# Patient Record
Sex: FEMALE | Race: WHITE
[De-identification: names, ages, dates, MRNs, and addresses within clinical notes are randomized per-mention and may not be internally consistent; named-entity substitution may affect disease eponyms.]

---

## 2017-03-24 ENCOUNTER — HOSPITAL ENCOUNTER (OUTPATIENT)
Dept: HOSPITAL 62 - RAD | Age: 52
End: 2017-03-24
Attending: PHYSICIAN ASSISTANT
Payer: MEDICARE

## 2017-03-24 ENCOUNTER — HOSPITAL ENCOUNTER (OUTPATIENT)
Dept: HOSPITAL 62 - OD | Age: 52
End: 2017-03-24
Attending: SPECIALIST
Payer: MEDICARE

## 2017-03-24 DIAGNOSIS — M25.561: ICD-10-CM

## 2017-03-24 DIAGNOSIS — M25.572: Primary | ICD-10-CM

## 2017-03-24 DIAGNOSIS — M17.12: ICD-10-CM

## 2017-03-24 DIAGNOSIS — E03.9: Primary | ICD-10-CM

## 2017-03-24 DIAGNOSIS — M25.571: ICD-10-CM

## 2017-03-24 DIAGNOSIS — M25.562: ICD-10-CM

## 2017-03-24 LAB
BASOPHILS # BLD AUTO: 0.1 10^3/UL (ref 0–0.2)
BASOPHILS NFR BLD AUTO: 0.8 % (ref 0–2)
EOSINOPHIL # BLD AUTO: 0.2 10^3/UL (ref 0–0.6)
EOSINOPHIL NFR BLD AUTO: 2.2 % (ref 0–6)
ERYTHROCYTE [DISTWIDTH] IN BLOOD BY AUTOMATED COUNT: 15.2 % (ref 11.5–14)
HCT VFR BLD CALC: 38.2 % (ref 36–47)
HGB BLD-MCNC: 12.6 G/DL (ref 12–15.5)
HGB HCT DIFFERENCE: -0.4
LYMPHOCYTES # BLD AUTO: 3.3 10^3/UL (ref 0.5–4.7)
LYMPHOCYTES NFR BLD AUTO: 40.7 % (ref 13–45)
MCH RBC QN AUTO: 31 PG (ref 27–33.4)
MCHC RBC AUTO-ENTMCNC: 33.1 G/DL (ref 32–36)
MCV RBC AUTO: 94 FL (ref 80–97)
MONOCYTES # BLD AUTO: 0.5 10^3/UL (ref 0.1–1.4)
MONOCYTES NFR BLD AUTO: 6.1 % (ref 3–13)
NEUTROPHILS # BLD AUTO: 4.1 10^3/UL (ref 1.7–8.2)
NEUTS SEG NFR BLD AUTO: 50.2 % (ref 42–78)
RBC # BLD AUTO: 4.08 10^6/UL (ref 3.72–5.28)
WBC # BLD AUTO: 8.2 10^3/UL (ref 4–10.5)

## 2017-03-24 PROCEDURE — 36415 COLL VENOUS BLD VENIPUNCTURE: CPT

## 2017-03-24 PROCEDURE — 86430 RHEUMATOID FACTOR TEST QUAL: CPT

## 2017-03-24 PROCEDURE — 86235 NUCLEAR ANTIGEN ANTIBODY: CPT

## 2017-03-24 PROCEDURE — 85652 RBC SED RATE AUTOMATED: CPT

## 2017-03-24 PROCEDURE — 85025 COMPLETE CBC W/AUTO DIFF WBC: CPT

## 2017-03-24 PROCEDURE — 84443 ASSAY THYROID STIM HORMONE: CPT

## 2017-03-24 PROCEDURE — 86225 DNA ANTIBODY NATIVE: CPT

## 2017-03-27 LAB — ENA JO1 AB SER-ACNC: <0.2 AI (ref 0–0.9)

## 2018-08-25 ENCOUNTER — HOSPITAL ENCOUNTER (EMERGENCY)
Dept: HOSPITAL 62 - ER | Age: 53
LOS: 1 days | Discharge: HOME | End: 2018-08-26
Payer: COMMERCIAL

## 2018-08-25 DIAGNOSIS — F17.200: ICD-10-CM

## 2018-08-25 DIAGNOSIS — M21.611: Primary | ICD-10-CM

## 2018-08-25 DIAGNOSIS — Z88.2: ICD-10-CM

## 2018-08-25 DIAGNOSIS — M79.671: ICD-10-CM

## 2018-08-25 DIAGNOSIS — M79.604: ICD-10-CM

## 2018-08-25 PROCEDURE — 99283 EMERGENCY DEPT VISIT LOW MDM: CPT

## 2018-08-26 VITALS — SYSTOLIC BLOOD PRESSURE: 134 MMHG | DIASTOLIC BLOOD PRESSURE: 85 MMHG

## 2018-08-26 NOTE — ER DOCUMENT REPORT
ED General





- General


Chief Complaint: Foot Pain


Stated Complaint: FOOT PAIN


Time Seen by Provider: 08/26/18 00:05


Notes: 





Patient is a 52-year-old female with a past medical history of chronic right 

lower extremity pain secondary to a right ankle injury in the remote past who 

presents with ongoing right lower cavity pain.  Patient states for at least the 

past 6 months she has had constant, throbbing, aching pain to her right ankle 

and right foot.  She states that walking worsens the pain.  Nothing that she 

has tried including ibuprofen improve the pain.  She states that she saw Dr. Guevara regarding her right knee but that he does not currently want to do a 

knee replacement due to her young age.  She states that she also used to follow 

with the podiatrist regarding her chronic foot and ankle pain that her 

podiatrist recently left the area.  Nothing is new or different about her 

symptoms that prompted a visit to the emergency department tonight.  She denies 

any spreading erythema, fever or constitutional symptoms.  No weakness or 

numbness.  No new trauma to the area.


TRAVEL OUTSIDE OF THE [place] IN LAST 30 DAYS: No





- Related Data


Allergies/Adverse Reactions: 


 





Sulfa (Sulfonamide Antibiotics) Allergy (Verified 12/29/16 18:53)


 











Past Medical History





- General


Information source: Patient





- Social History


Smoking Status: Current Every Day Smoker


Chew tobacco use (# tins/day): No


Frequency of alcohol use: None


Drug Abuse: None


Lives with: Spouse/Significant other


Family History: Reviewed & Not Pertinent


Patient has suicidal ideation: No


Patient has homicidal ideation: No





- Past Medical History


Cardiac Medical History: 


   Denies: Hx Coronary Artery Disease, Hx Heart Attack, Hx Hypertension


Pulmonary Medical History: 


   Denies: Hx Asthma, Hx Bronchitis, Hx COPD, Hx Pneumonia


Neurological Medical History: Denies: Hx Cerebrovascular Accident, Hx Seizures


Renal/ Medical History: Denies: Hx Peritoneal Dialysis


Musculoskeletal Medical History: Denies Hx Arthritis


Psychiatric Medical History: Reports: Hx Bipolar Disorder, Hx Depression


Past Surgical History: Reports: Hx Breast Surgery - partial left , [place], Hx 

Orthopedic Surgery - R wrist carpal tunnel.  Denies: Hx Pacemaker





- Immunizations


Hx Diphtheria, Pertussis, Tetanus Vaccination: Yes





Review of Systems





- Review of Systems


Notes: 





Constitutional: Negative for fever.


HENT: Negative for sore throat.


Eyes: Negative for visual changes.


Cardiovascular: Negative for chest pain.


Respiratory: Negative for shortness of breath.


Gastrointestinal: Negative for abdominal pain, vomiting or diarrhea.


Genitourinary: Negative for dysuria.


Musculoskeletal: Positive for right leg pain


Skin: Negative for rash.


Neurological: Negative for headaches, weakness or numbness.





10 point ROS negative except as marked above and in HPI.





Physical Exam





- Vital signs


Vitals: 


 











Temp Pulse Resp BP Pulse Ox


 


 98.3 F   98   18   135/70 H  97 


 


 08/25/18 23:42  08/25/18 23:42  08/25/18 23:42  08/25/18 23:42  08/25/18 23:42











Interpretation: Normal


Notes: 





PHYSICAL EXAMINATION:





GENERAL: Well-appearing, well-nourished and in no acute distress.





HEAD: Atraumatic, normocephalic.





EYES:  sclera anicteric, conjunctiva are normal.





ENT: Moist mucous membranes.





NECK: Normal range of motion





LUNGS: Normal work of breathing





HEART: 2+ DP pulses bilaterally





EXTREMITIES: no pitting or edema.  No cyanosis.  Normal dorsi and plantar 

flexion of the right ankle.  Full flexion and extension of the right knee.  No 

pain on palpation of the popliteal fossa.  No distinct areas of pain on 

palpation of the right ankle or foot.





NEUROLOGICAL: No focal neurological deficits. Moves all extremities 

spontaneously and on command.





PSYCH: Normal mood, normal affect.





SKIN: Warm, Dry, normal turgor, there is a bunion at the distal aspect of the 

right great toe





Course





- Re-evaluation


Re-evalutation: 





08/26/18 01:27


No evidence of a septic joint, gout flare, DVT, dislocation, or fracture on 

exam and imaging.  Patient has chronic right lower extremity pain has been 

present for at least the past 6 months.  She does have a large bunion on her 

right great toe.  Vitals wnl. At this time, I do not see an indication for labs 

or further imaging. Will discharge with conservative measures, return 

precautions, and follow-up recommendations.  I have referred her to podiatry.





- Vital Signs


Vital signs: 


 











Temp Pulse Resp BP Pulse Ox


 


 97.6 F   86   16   134/85 H  96 


 


 08/26/18 01:50  08/26/18 01:50  08/26/18 01:50  08/26/18 01:50  08/26/18 01:50














- Diagnostic Test


Radiology reviewed: Image reviewed, Reports reviewed


Radiology results interpreted by me: 





08/26/18 01:27


Right foot x-ray: No acute fracture dislocation





Discharge





- Discharge


Clinical Impression: 


 Pain of right lower extremity, Right foot pain, Bunion of great toe of right 

foot





Condition: Good


Disposition: HOME, SELF-CARE


Additional Instructions: 


Your x-ray does not show any acute fracture today.  A podiatrist as listed in 

her paperwork and he should follow-up with him as your earliest ability.  For 

your pain: Take ibuprofen 600 mg and acetaminophen 1000 mg every 6 hours 

together as needed for pain.  Continue to wear the compression stocking that 

has been applied.  Please return immediately if you develop weakness, numbness, 

spreading redness from the area, or any other symptoms that are concerning to 

you.


Prescriptions: 


Ibuprofen 800 mg PO TID #30 tablet


Referrals: 


YAIR REYES DPM [ACTIVE STAFF] - Follow up as needed

## 2018-08-26 NOTE — RADIOLOGY REPORT (SQ)
EXAM DESCRIPTION: 



XR FOOT 3 OR MORE VIEWS



COMPLETED DATE/TME:  08/26/2018 00:10



CLINICAL HISTORY: 



52 years, Female, pain



COMPARISON:

3/24/2017



FINDINGS:



3 views of the right foot. Hallux valgus deformity. Degenerative

changes at first tarsometatarsal joint. Osteopenia. No acute

fracture or dislocation identified. Tarsals and metatarsals are

appropriately aligned.



IMPRESSION:



No acute fracture or dislocation.

 



 2011 [employer]- All Rights Reserved

## 2019-03-01 ENCOUNTER — HOSPITAL ENCOUNTER (OUTPATIENT)
Dept: HOSPITAL 62 - WI | Age: 54
End: 2019-03-01
Attending: INTERNAL MEDICINE
Payer: MEDICARE

## 2019-03-01 DIAGNOSIS — Z12.31: Primary | ICD-10-CM

## 2019-03-01 PROCEDURE — 77063 BREAST TOMOSYNTHESIS BI: CPT

## 2019-03-01 PROCEDURE — 77067 SCR MAMMO BI INCL CAD: CPT

## 2019-03-01 NOTE — WOMENS IMAGING REPORT
EXAM DESCRIPTION:  3D SCREENING MAMMO BILAT



COMPLETED DATE/TIME:  3/1/2019 1:49 pm



REASON FOR STUDY:  ROUTINE 3D BILATERAL SCREENING Z12.31 Z12.31  ENCNTR SCREEN MAMMOGRAM FOR MALIGNAN
T NEOPLASM OF LILIAN



COMPARISON:  MULTIPLE SINCE 2011



TECHNIQUE:  Standard craniocaudal and mediolateral oblique views of each breast recorded using digita
l acquisition and breast tomosynthesis.



LIMITATIONS:  None.



FINDINGS:  Findings present which are benign by mammographic criteria.  No suspicious masses, calcifi
cations or architectural distortion.

Pertinent benign findings: Post lumpectomy changes left breast 11 to 12 o'clock position with surgica
l clips and benign dystrophic calcifications.  Left breast skin thickening post radiation therapy

Read with the assistance of CAD.

.[facility] - R2 [employer] Version 1.3

.[facility] - R2 [employer] Version 2.1

.[facility] - R2 [employer] Version 2.4

.[facility] - R2 [employer] Version 2.4

.[facility] - R2  Version 9.2

Benign mammographic findings may include one or more of the following:  Smooth masses, popcorn/rim/co
arse calcifications, asymmetries, post-procedure changes, and lesions with long-standing stability.



IMPRESSION:  BENIGN MAMMOGRAPHIC FINDINGS.  BIRADS 2



BREAST DENSITY:  b. There are scattered areas of fibroglandular density.



BIRAD:  2 BENIGN FINDING(S)



RECOMMENDATION:  RECOMMENDATION: ROUTINE SCREENING



COMMENT:  The patient has been notified of the results by letter per SA requirements. Additional no
tification policies are in place for contacting patient with suspicious or incomplete findings.

Quality ID #225: The American College of Radiology recommends an annual screening mammogram for women
 aged 40 years or over. This facility utilizes a reminder system to ensure that all patients receive 
reminder letters, and/or direct phone calls for appointments. This includes reminders for routine scr
eening mammograms, diagnostic mammograms, or other Breast Imaging Interventions when appropriate.  Th
is patient will be placed in the appropriate reminder system.

The American College of Radiology (ACR) has developed recommendations for screening MRI of the breast
s in certain patient populations, to be used in conjunction with mammography.  Breast MRI surveillanc
e may be appropriate for women with more than 20% lifetime risk of developing breast cancer  as deter
mined by genetic testing, significant family history of the disease, or history of mantle radiation f
or Hodgkins Disease.  ACR Practice Guidelines 2008.

[employer]



PQRS 6045F: Fluoroscopic imaging is not utilized for breast tomosynthesis.



TECHNICAL DOCUMENTATION:  FINDING NUMBER: (1)

ASSESSMENT: (1)

JOB ID:  [id number]

 2011 [employer]- All Rights Reserved



Reading location - IP/workstation name: [email or web address]

## 2020-06-16 NOTE — RADIOLOGY REPORT (SQ)
EXAM DESCRIPTION:  CT SOFT TISSUE NECK COMBO



IMAGES COMPLETED DATE/TIME:  6/16/2020 1:32 pm



REASON FOR STUDY:  R22.1 LOCALIZED SWELLING, MASS AND LUMP, NECK R22.1  LOCALIZED SWELLING, MASS AND 
LUMP, NECK



COMPARISON:  None.



TECHNIQUE:  Post IV contrasted scanning from skull base through lung apices with review of bone, soft
 tissue and lung windows.  Reconstructed coronal and sagittal MPR images reviewed.  All images stored
 on PACS.

All CT scanners at this facility use dose modulation, iterative reconstruction, and/or weight based d
osing when appropriate to reduce radiation dose to as low as reasonably achievable (ALARA).

[facility]: Dose Right  [facility]: CareDose    [facility]: Dose Right    [facility]: Teradose 4D    [facility]: Smart Technologies



CONTRAST TYPE AND DOSE:  75 mL Omnipaque 350- low osmolar.



RENAL FUNCTION:  Creatinine 0.8



RADIATION DOSE:   .



LIMITATIONS:  None.



FINDINGS:  SKULL BASE: Intact.

MAJOR SALIVARY GLANDS: No solid or cystic masses.  No inflammatory changes.

LYMPHADENOPATHY: No adenopathy.

MUCOSAL MASSES OR ASYMMETRY: No mucosal masses or asymmetry.

LARYNX/CORDS: No abnormal findings.

VASCULAR STRUCTURES: The major vessels are patent.  The skin marker localizes over the right sternocl
eidomastoid in the region of a venous valve.

LUNG APICES: No pneumothorax.  Few subpleural air cysts.  No masses.

BONES: Intact.

THYROID: Normal size.  No masses.

PARANASAL SINUSES: Clear.

OTHER: No other significant finding.



IMPRESSION:  The patient-identified area of concern localizes over the right sternocleidomastoid in t
he region of a valve within a external jugular vein tributary.  No evidence of mass in this area.



TECHNICAL DOCUMENTATION:  JOB ID:  [id number]

Quality ID # 436: Final reports with documentation of one or more dose reduction techniques (e.g., Au
tomated exposure control, adjustment of the mA and/or kV according to patient size, use of iterative 
reconstruction technique)

 2011 [employer]- All Rights Reserved



Reading location - IP/workstation name: [email or web address]

## 2020-06-29 NOTE — WOMENS IMAGING REPORT
EXAM DESCRIPTION:  3D SCREENING MAMMO BILAT



IMAGES COMPLETED DATE/TIME:  6/29/2020 9:51 am



REASON FOR STUDY:  Z12.31 ENCOUNTER FOR SCREENING MAMMOGRAM FOR MALIGNANT NEOPLASM OF BREAST Z12.31  
ENCNTR SCREEN MAMMOGRAM FOR MALIGNANT NEOPLASM OF LILIAN



COMPARISON:  2017 and subsequent.



EXAM PARAMETERS:  Standard craniocaudal and mediolateral oblique views of each breast recorded using 
digital acquisition and breast tomosynthesis.

Read with the assistance of CAD.

.[facility] [employer]  Version 9.2



LIMITATIONS:  None.



FINDINGS:  Findings present which are benign by mammographic criteria. No suspicious masses, calcific
ations or architectural distortion.

Pertinent benign findings: Postoperative changes left breast including slight distortion, surgical cl
ips and coarse dystrophic calcification.

Benign mammographic findings may include one or more of the following: Smooth masses, popcorn/rim/coa
rse calcifications, asymmetries, post-procedure changes, and lesions with long-standing stability.



IMPRESSION:  BENIGN MAMMOGRAPHIC FINDINGS.  BIRADS 2



BREAST DENSITY:  b. There are scattered areas of fibroglandular density.



BIRAD:  ASSESSMENT:  2 BENIGN FINDING(S)



RECOMMENDATION:  ROUTINE SCREENING



COMMENT:  The patient has been notified of the results by letter per MQSA requirements. Additional no
tification policies are in place for contacting patient with suspicious or incomplete findings.

Quality ID #225: The American College of Radiology recommends an annual screening mammogram for women
 aged 40 years or over. This facility utilizes a reminder system to ensure that all patients receive 
reminder letters, and/or direct phone calls for appointments. This includes reminders for routine scr
eening mammograms, diagnostic mammograms, or other Breast Imaging Interventions when appropriate.  Th
is patient will be placed in the appropriate reminder system.



TECHNICAL DOCUMENTATION:  FINDING NUMBER: (1)

ASSESSMENT:  (1)

JOB ID:  [id number]

 2011 [employer]- All Rights Reserved



Reading location - IP/workstation name: [email or web address]